# Patient Record
Sex: FEMALE | Race: WHITE
[De-identification: names, ages, dates, MRNs, and addresses within clinical notes are randomized per-mention and may not be internally consistent; named-entity substitution may affect disease eponyms.]

---

## 2017-06-02 ENCOUNTER — HOSPITAL ENCOUNTER (EMERGENCY)
Dept: HOSPITAL 47 - EC | Age: 57
Discharge: HOME | End: 2017-06-02
Payer: MEDICARE

## 2017-06-02 VITALS — DIASTOLIC BLOOD PRESSURE: 85 MMHG | HEART RATE: 95 BPM | RESPIRATION RATE: 20 BRPM | SYSTOLIC BLOOD PRESSURE: 192 MMHG

## 2017-06-02 VITALS — TEMPERATURE: 99.9 F

## 2017-06-02 DIAGNOSIS — Z88.6: ICD-10-CM

## 2017-06-02 DIAGNOSIS — Z79.899: ICD-10-CM

## 2017-06-02 DIAGNOSIS — H92.01: ICD-10-CM

## 2017-06-02 DIAGNOSIS — R04.0: Primary | ICD-10-CM

## 2017-06-02 DIAGNOSIS — I10: ICD-10-CM

## 2017-06-02 DIAGNOSIS — F17.200: ICD-10-CM

## 2017-06-02 PROCEDURE — 99283 EMERGENCY DEPT VISIT LOW MDM: CPT

## 2017-06-02 NOTE — ED
ENT HPI





- General


Stated complaint: nose bleed


Time Seen by Provider: 06/02/17 19:59


Source: EMS, RN notes reviewed


Mode of arrival: EMS


Limitations: no limitations





- History of Present Illness


Initial comments: 





56-year-old female presents to the emergency department with a chief complaint 

of epistaxis.  She states all centered now started to bleed today.  Patient 

states she has a history of nosebleeds.  Patient states she tried several home 

was unsuccessful.  Patient states it seems to have stopped now.  Patient states 

that she is getting some right ear pain as well.  Patient denies any fever 

chills cough cold runny nose.  Patient states that she just continued to have 

the bleeding so she thought that she should be seen and possibly get it taken 

care of.  Patient states that she is not currently having any other symptoms.

Patient denies any recent fever, chills, shortness of breath, chest pain, back 

pain, abdominal pain, nausea vomiting, numbness or tingling, dysuria or 

hematuria, constipation or diarrhea, headaches or visual changes, or any other 

current symptoms.





- Related Data


 Home Medications











 Medication  Instructions  Recorded  Confirmed


 


Ibuprofen [Motrin] 800 mg PO DAILY PRN 06/02/17 06/02/17


 


Lisinopril [Zestril] 10 mg PO DAILY 06/02/17 06/02/17


 


PARoxetine [Paxil] 20 mg PO DAILY 06/02/17 06/02/17








 Previous Rx's











 Medication  Instructions  Recorded


 


Amoxicillin/Potassium Clav 1 tab PO Q12HR #6 tab 06/02/17





[Augmentin 875-125 Tablet]  











 Allergies











Allergy/AdvReac Type Severity Reaction Status Date / Time


 


aspirin AdvReac  BRUISES Verified 06/02/17 20:16














Review of Systems


ROS Statement: 


Those systems with pertinent positive or pertinent negative responses have been 

documented in the HPI.





ROS Other: All systems not noted in ROS Statement are negative.





Past Medical History


Past Medical History: Hypertension


History of Any Multi-Drug Resistant Organisms: None Reported


Past Surgical History: Back Surgery, Hernia Repair, Hysterectomy


Past Psychological History: No Psychological Hx Reported


Smoking Status: Current every day smoker


Past Alcohol Use History: Daily


Past Drug Use History: None Reported





General Exam


Limitations: no limitations


General appearance: alert, in no apparent distress


Head exam: Present: atraumatic, normocephalic, normal inspection


Eye exam: Present: normal appearance, PERRL, EOMI.  Absent: scleral icterus, 

conjunctival injection, periorbital swelling


ENT exam: Present: normal oropharynx, other (Patient appears to bleeding from 

bilateral Kiesselbach's plexus).  Absent: TM's normal bilaterally (She does 

appear to have blood behind the right eardrum)


Neck exam: Present: normal inspection.  Absent: tenderness, meningismus, 

lymphadenopathy


Respiratory exam: Present: normal lung sounds bilaterally.  Absent: respiratory 

distress, wheezes, rales, rhonchi, stridor


Cardiovascular Exam: Present: regular rate, normal rhythm, normal heart sounds.

  Absent: systolic murmur, diastolic murmur, rubs, gallop, clicks


Neurological exam: Present: alert, oriented X3


Psychiatric exam: Present: normal affect, normal mood


Skin exam: Present: warm, dry, intact, normal color.  Absent: rash





Course


 Vital Signs











  06/02/17





  19:57


 


Temperature 99.9 F H


 


Pulse Rate 94


 


Respiratory 18





Rate 


 


Blood Pressure 156/79


 


O2 Sat by Pulse 95





Oximetry 














- Reevaluation(s)


Reevaluation #1: 





06/02/17 20:52


Patient's bleeding has stopped with the clamp.  This time we removed the clamp 

and feel the patient progresses.





Medical Decision Making





- Medical Decision Making





56-year-old female presents emergency Department chief complaint of epistaxis.  

Patient epistaxis.  Nasal clamping.  This time we discussed follow-up discussed 

return parameters discussed care.  We discussed all patient's questions.  She 

stated that she understood and she is the plan.  She will be discharged home.





Disposition


Clinical Impression: 


 Epistaxis





Disposition: HOME SELF-CARE


Condition: Stable


Instructions:  Nosebleed (ED)


Additional Instructions: 


Please use medication as discussed. Please follow up with family doctor if 

symptoms have not improved over the next two days. Please return to the 

emergency room if your symptoms increase or worsen or for any other concerns. 


Prescriptions: 


Amoxicillin/Potassium Clav [Augmentin 875-125 Tablet] 1 tab PO Q12HR #6 tab


Referrals: 


Leonila Pinon MD [Primary Care Provider] - 1-2 days


Jan Ventura MD [STAFF PHYSICIAN] - 1-2 days


Time of Disposition: 21:47

## 2018-10-21 ENCOUNTER — HOSPITAL ENCOUNTER (EMERGENCY)
Dept: HOSPITAL 47 - EC | Age: 58
LOS: 1 days | Discharge: HOME | End: 2018-10-22
Payer: MEDICARE

## 2018-10-21 VITALS — RESPIRATION RATE: 18 BRPM | TEMPERATURE: 98.7 F

## 2018-10-21 DIAGNOSIS — F17.200: ICD-10-CM

## 2018-10-21 DIAGNOSIS — I10: ICD-10-CM

## 2018-10-21 DIAGNOSIS — Z88.6: ICD-10-CM

## 2018-10-21 DIAGNOSIS — R55: Primary | ICD-10-CM

## 2018-10-21 DIAGNOSIS — Z79.899: ICD-10-CM

## 2018-10-21 DIAGNOSIS — F41.9: ICD-10-CM

## 2018-10-21 LAB
ALBUMIN SERPL-MCNC: 4.3 G/DL (ref 3.5–5)
ALP SERPL-CCNC: 77 U/L (ref 38–126)
ALT SERPL-CCNC: 54 U/L (ref 9–52)
ANION GAP SERPL CALC-SCNC: 14 MMOL/L
APTT BLD: 26.4 SEC (ref 22–30)
AST SERPL-CCNC: 48 U/L (ref 14–36)
BUN SERPL-SCNC: 8 MG/DL (ref 7–17)
CALCIUM SPEC-MCNC: 9 MG/DL (ref 8.4–10.2)
CELLS COUNTED: 100
CHLORIDE SERPL-SCNC: 99 MMOL/L (ref 98–107)
CK SERPL-CCNC: 328 U/L (ref 30–135)
CO2 SERPL-SCNC: 19 MMOL/L (ref 22–30)
D DIMER PPP FEU-MCNC: 0.67 MG/L FEU (ref ?–0.6)
EOSINOPHIL # BLD MANUAL: 0.07 K/UL (ref 0–0.7)
ERYTHROCYTE [DISTWIDTH] IN BLOOD BY AUTOMATED COUNT: 4.07 M/UL (ref 3.8–5.4)
ERYTHROCYTE [DISTWIDTH] IN BLOOD: 13.5 % (ref 11.5–15.5)
GLUCOSE SERPL-MCNC: 95 MG/DL (ref 74–99)
HCT VFR BLD AUTO: 38.9 % (ref 34–46)
HGB BLD-MCNC: 12.2 GM/DL (ref 11.4–16)
INR PPP: 1 (ref ?–1.2)
LYMPHOCYTES # BLD MANUAL: 1.94 K/UL (ref 1–4.8)
MCH RBC QN AUTO: 29.9 PG (ref 25–35)
MCHC RBC AUTO-ENTMCNC: 31.3 G/DL (ref 31–37)
MCV RBC AUTO: 95.7 FL (ref 80–100)
MONOCYTES # BLD MANUAL: 0.34 K/UL (ref 0–1)
NEUTROPHILS NFR BLD MANUAL: 64 %
NEUTS SEG # BLD MANUAL: 4.3 K/UL (ref 1.3–7.7)
PH UR: 6 [PH] (ref 5–8)
PLATELET # BLD AUTO: 358 K/UL (ref 150–450)
POTASSIUM SERPL-SCNC: 4.3 MMOL/L (ref 3.5–5.1)
PROT SERPL-MCNC: 7.7 G/DL (ref 6.3–8.2)
PT BLD: 9.6 SEC (ref 9–12)
SODIUM SERPL-SCNC: 132 MMOL/L (ref 137–145)
SP GR UR: 1 (ref 1–1.03)
TROPONIN I SERPL-MCNC: <0.012 NG/ML (ref 0–0.03)
UROBILINOGEN UR QL STRIP: <2 MG/DL (ref ?–2)
WBC # BLD AUTO: 6.7 K/UL (ref 3.8–10.6)

## 2018-10-21 PROCEDURE — 85379 FIBRIN DEGRADATION QUANT: CPT

## 2018-10-21 PROCEDURE — 71045 X-RAY EXAM CHEST 1 VIEW: CPT

## 2018-10-21 PROCEDURE — 85610 PROTHROMBIN TIME: CPT

## 2018-10-21 PROCEDURE — 80053 COMPREHEN METABOLIC PANEL: CPT

## 2018-10-21 PROCEDURE — 84484 ASSAY OF TROPONIN QUANT: CPT

## 2018-10-21 PROCEDURE — 93005 ELECTROCARDIOGRAM TRACING: CPT

## 2018-10-21 PROCEDURE — 99284 EMERGENCY DEPT VISIT MOD MDM: CPT

## 2018-10-21 PROCEDURE — 82550 ASSAY OF CK (CPK): CPT

## 2018-10-21 PROCEDURE — 82553 CREATINE MB FRACTION: CPT

## 2018-10-21 PROCEDURE — 80320 DRUG SCREEN QUANTALCOHOLS: CPT

## 2018-10-21 PROCEDURE — 36415 COLL VENOUS BLD VENIPUNCTURE: CPT

## 2018-10-21 PROCEDURE — 85730 THROMBOPLASTIN TIME PARTIAL: CPT

## 2018-10-21 PROCEDURE — 81003 URINALYSIS AUTO W/O SCOPE: CPT

## 2018-10-21 PROCEDURE — 85025 COMPLETE CBC W/AUTO DIFF WBC: CPT

## 2018-10-21 NOTE — XR
EXAMINATION TYPE: XR chest 1V portable

 

DATE OF EXAM: 10/21/2018

 

COMPARISON: NONE

 

HISTORY: Syncope

 

TECHNIQUE: Single frontal view of the chest is obtained.

 

FINDINGS:  Heart is normal. Lungs are clear of consolidation. Costophrenic angles are clear. There ar
e chest leads. Bony thorax is intact. There is no pleural effusion.

 

IMPRESSION:  No active cardiopulmonary disease.

## 2018-10-22 VITALS — HEART RATE: 87 BPM | DIASTOLIC BLOOD PRESSURE: 56 MMHG | SYSTOLIC BLOOD PRESSURE: 112 MMHG

## 2018-10-22 NOTE — ED
Syncope HPI





- General


Chief Complaint: Fall


Stated Complaint: Syncope


Time Seen by Provider: 10/21/18 22:30


Source: patient, EMS


Mode of arrival: EMS


Limitations: no limitations





- History of Present Illness


Initial Comments: 





This patient's 58-year-old woman who presents to be evaluated if she has 

syncopal episode tonight.  Patient had been in her kitchen at home.  She was 

seated on a stool.  Hours  noted that he heard a sound and she had 

fallen to the floor.  Patient reports that she feels she passed out.  She did 

not have any monitory symptoms, including no chest pain, palpitations, dyspnea, 

diaphoresis or other symptoms.  She denies having trauma.  She does admit that 

she had about 8 beers to drink today.  Patient had a similar episode this past 

week, acquiring a laceration that required suture repair here.


MD Complaint: loss of consciousness, collapsed


Onset/Timin


-: hour(s)


Prodromal Symptoms: none


Witnessed: yes - by bystander


Injuries Sustained Associated with Event: None


Current Symptoms: back to baseline


Context: alcohol use


Treatments Prior to Arrival: none





- Related Data


 Home Medications











 Medication  Instructions  Recorded  Confirmed


 


Ibuprofen [Motrin] 800 mg PO DAILY PRN 17


 


Lisinopril [Zestril] 10 mg PO DAILY 17


 


PARoxetine [Paxil] 20 mg PO DAILY 17








 Previous Rx's











 Medication  Instructions  Recorded


 


Amoxicillin/Potassium Clav 1 tab PO Q12HR #6 tab 17





[Augmentin 875-125 Tablet]  











 Allergies











Allergy/AdvReac Type Severity Reaction Status Date / Time


 


aspirin AdvReac  BRUISES Verified 17 20:16














Review of Systems


ROS Statement: 


Those systems with pertinent positive or pertinent negative responses have been 

documented in the HPI.





ROS Other: All systems not noted in ROS Statement are negative.


Constitutional: Denies: fever, chills, weakness


Eyes: Denies: eye pain, vision change


ENT: Denies: epistaxis


Respiratory: Denies: cough, dyspnea


Cardiovascular: Reports: syncope.  Denies: chest pain, palpitations, edema


Gastrointestinal: Denies: abdominal pain, nausea, vomiting


Genitourinary: Denies: dysuria


Musculoskeletal: Denies: back pain


Skin: Denies: rash


Neurological: Denies: headache, weakness, numbness





Past Medical History


Past Medical History: Hypertension, Syncope


History of Any Multi-Drug Resistant Organisms: None Reported


Past Surgical History: Back Surgery, Hernia Repair, Hysterectomy


Past Psychological History: Anxiety


Smoking Status: Current every day smoker


Past Alcohol Use History: Daily


Past Drug Use History: None Reported





General Exam


Limitations: no limitations


General appearance: alert, in no apparent distress


Head exam: Present: atraumatic, normocephalic


Eye exam: Present: normal appearance.  Absent: scleral icterus, conjunctival 

injection


ENT exam: Present: normal oropharynx


Neck exam: Present: normal inspection, full ROM.  Absent: tenderness


Respiratory exam: Present: normal lung sounds bilaterally.  Absent: respiratory 

distress, wheezes, rales, rhonchi, stridor, chest wall tenderness


Cardiovascular Exam: Present: regular rate, normal rhythm, normal heart sounds.

  Absent: systolic murmur, diastolic murmur, rubs, gallop


GI/Abdominal exam: Present: soft.  Absent: distended, tenderness, guarding, 

rebound, rigid, mass


Extremities exam: Present: normal inspection, normal capillary refill.  Absent: 

pedal edema, calf tenderness


Back exam: Present: normal inspection.  Absent: CVA tenderness (R), CVA 

tenderness (L), vertebral tenderness


Neurological exam: Present: alert, oriented X3, CN II-XII intact.  Absent: 

motor sensory deficit


Skin exam: Present: warm, dry, intact, normal color.  Absent: rash





Course


 Vital Signs











  10/21/18 10/21/18 10/21/18





  22:17 23:00 23:30


 


Temperature 98.7 F  


 


Pulse Rate 84 86 90


 


Respiratory 18 23 20





Rate   


 


Blood Pressure 131/77 131/77 131/77


 


O2 Sat by Pulse 93 L 94 L 95





Oximetry   














  10/22/18





  00:30


 


Temperature 


 


Pulse Rate 87


 


Respiratory 18





Rate 


 


Blood Pressure 112/56


 


O2 Sat by Pulse 95





Oximetry 














Medical Decision Making





- Lab Data


Result diagrams: 


 10/21/18 22:52





 10/21/18 22:52


 Lab Results











  10/21/18 10/21/18 10/21/18 Range/Units





  22:52 22:52 22:52 


 


WBC   6.7   (3.8-10.6)  k/uL


 


RBC   4.07   (3.80-5.40)  m/uL


 


Hgb   12.2   (11.4-16.0)  gm/dL


 


Hct   38.9   (34.0-46.0)  %


 


MCV   95.7   (80.0-100.0)  fL


 


MCH   29.9   (25.0-35.0)  pg


 


MCHC   31.3   (31.0-37.0)  g/dL


 


RDW   13.5   (11.5-15.5)  %


 


Plt Count   358   (150-450)  k/uL


 


Neutrophils % (Manual)   64   %


 


Band Neutrophils %   1   %


 


Lymphocytes % (Manual)   29   %


 


Monocytes % (Manual)   5   %


 


Eosinophils % (Manual)   1   %


 


Neutrophils # (Manual)   4.30   (1.3-7.7)  k/uL


 


Lymphocytes # (Manual)   1.94   (1.0-4.8)  k/uL


 


Monocytes # (Manual)   0.34   (0-1.0)  k/uL


 


Eosinophils # (Manual)   0.07   (0-0.7)  k/uL


 


Nucleated RBCs   0   (0-0)  /100 WBC


 


Manual Slide Review   Performed   


 


PT     (9.0-12.0)  sec


 


INR     (<1.2)  


 


APTT     (22.0-30.0)  sec


 


D-Dimer     (<0.60)  mg/L FEU


 


Sodium    132 L  (137-145)  mmol/L


 


Potassium    4.3  (3.5-5.1)  mmol/L


 


Chloride    99  ()  mmol/L


 


Carbon Dioxide    19 L  (22-30)  mmol/L


 


Anion Gap    14  mmol/L


 


BUN    8  (7-17)  mg/dL


 


Creatinine    0.65  (0.52-1.04)  mg/dL


 


Est GFR (CKD-EPI)AfAm    >90  (>60 ml/min/1.73 sqM)  


 


Est GFR (CKD-EPI)NonAf    >90  (>60 ml/min/1.73 sqM)  


 


Glucose    95  (74-99)  mg/dL


 


Calcium    9.0  (8.4-10.2)  mg/dL


 


Total Bilirubin    0.3  (0.2-1.3)  mg/dL


 


AST    48 H  (14-36)  U/L


 


ALT    54 H  (9-52)  U/L


 


Alkaline Phosphatase    77  ()  U/L


 


Total Creatine Kinase  328 H    ()  U/L


 


CK-MB (CK-2)  1.7    (0.0-2.4)  ng/mL


 


CK-MB (CK-2) Rel Index  0.5    


 


Troponin I  <0.012    (0.000-0.034)  ng/mL


 


Total Protein    7.7  (6.3-8.2)  g/dL


 


Albumin    4.3  (3.5-5.0)  g/dL


 


Urine Color     


 


Urine Appearance     (Clear)  


 


Urine pH     (5.0-8.0)  


 


Ur Specific Gravity     (1.001-1.035)  


 


Urine Protein     (Negative)  


 


Urine Glucose (UA)     (Negative)  


 


Urine Ketones     (Negative)  


 


Urine Blood     (Negative)  


 


Urine Nitrite     (Negative)  


 


Urine Bilirubin     (Negative)  


 


Urine Urobilinogen     (<2.0)  mg/dL


 


Ur Leukocyte Esterase     (Negative)  


 


Serum Alcohol     mg/dL














  10/21/18 10/21/18 10/21/18 Range/Units





  22:52 22:52 23:55 


 


WBC     (3.8-10.6)  k/uL


 


RBC     (3.80-5.40)  m/uL


 


Hgb     (11.4-16.0)  gm/dL


 


Hct     (34.0-46.0)  %


 


MCV     (80.0-100.0)  fL


 


MCH     (25.0-35.0)  pg


 


MCHC     (31.0-37.0)  g/dL


 


RDW     (11.5-15.5)  %


 


Plt Count     (150-450)  k/uL


 


Neutrophils % (Manual)     %


 


Band Neutrophils %     %


 


Lymphocytes % (Manual)     %


 


Monocytes % (Manual)     %


 


Eosinophils % (Manual)     %


 


Neutrophils # (Manual)     (1.3-7.7)  k/uL


 


Lymphocytes # (Manual)     (1.0-4.8)  k/uL


 


Monocytes # (Manual)     (0-1.0)  k/uL


 


Eosinophils # (Manual)     (0-0.7)  k/uL


 


Nucleated RBCs     (0-0)  /100 WBC


 


Manual Slide Review     


 


PT  9.6    (9.0-12.0)  sec


 


INR  1.0    (<1.2)  


 


APTT  26.4    (22.0-30.0)  sec


 


D-Dimer  0.67 H    (<0.60)  mg/L FEU


 


Sodium     (137-145)  mmol/L


 


Potassium     (3.5-5.1)  mmol/L


 


Chloride     ()  mmol/L


 


Carbon Dioxide     (22-30)  mmol/L


 


Anion Gap     mmol/L


 


BUN     (7-17)  mg/dL


 


Creatinine     (0.52-1.04)  mg/dL


 


Est GFR (CKD-EPI)AfAm     (>60 ml/min/1.73 sqM)  


 


Est GFR (CKD-EPI)NonAf     (>60 ml/min/1.73 sqM)  


 


Glucose     (74-99)  mg/dL


 


Calcium     (8.4-10.2)  mg/dL


 


Total Bilirubin     (0.2-1.3)  mg/dL


 


AST     (14-36)  U/L


 


ALT     (9-52)  U/L


 


Alkaline Phosphatase     ()  U/L


 


Total Creatine Kinase     ()  U/L


 


CK-MB (CK-2)     (0.0-2.4)  ng/mL


 


CK-MB (CK-2) Rel Index     


 


Troponin I     (0.000-0.034)  ng/mL


 


Total Protein     (6.3-8.2)  g/dL


 


Albumin     (3.5-5.0)  g/dL


 


Urine Color   Colorless   


 


Urine Appearance   Clear   (Clear)  


 


Urine pH   6.0   (5.0-8.0)  


 


Ur Specific Gravity   1.003   (1.001-1.035)  


 


Urine Protein   Negative   (Negative)  


 


Urine Glucose (UA)   Negative   (Negative)  


 


Urine Ketones   Negative   (Negative)  


 


Urine Blood   Negative   (Negative)  


 


Urine Nitrite   Negative   (Negative)  


 


Urine Bilirubin   Negative   (Negative)  


 


Urine Urobilinogen   <2.0   (<2.0)  mg/dL


 


Ur Leukocyte Esterase   Negative   (Negative)  


 


Serum Alcohol    123  mg/dL














Disposition


Clinical Impression: 


 Syncope





Disposition: HOME SELF-CARE


Condition: Good


Instructions:  Syncope (ED)


Is patient prescribed a controlled substance at d/c from ED?: No


Referrals: 


Leonila Pinon MD [Primary Care Provider] - 1-2 days

## 2018-10-27 ENCOUNTER — HOSPITAL ENCOUNTER (EMERGENCY)
Dept: HOSPITAL 47 - EC | Age: 58
Discharge: HOME | End: 2018-10-27
Payer: MEDICARE

## 2018-10-27 VITALS — HEART RATE: 98 BPM | RESPIRATION RATE: 16 BRPM | SYSTOLIC BLOOD PRESSURE: 116 MMHG | DIASTOLIC BLOOD PRESSURE: 91 MMHG

## 2018-10-27 DIAGNOSIS — F17.200: ICD-10-CM

## 2018-10-27 DIAGNOSIS — Z88.6: ICD-10-CM

## 2018-10-27 DIAGNOSIS — H73.893: Primary | ICD-10-CM

## 2018-10-27 DIAGNOSIS — Z79.899: ICD-10-CM

## 2018-10-27 DIAGNOSIS — I10: ICD-10-CM

## 2018-10-27 DIAGNOSIS — Z96.22: ICD-10-CM

## 2018-10-27 DIAGNOSIS — R04.0: ICD-10-CM

## 2018-10-27 DIAGNOSIS — F41.9: ICD-10-CM

## 2018-10-27 PROCEDURE — 99283 EMERGENCY DEPT VISIT LOW MDM: CPT

## 2018-10-27 PROCEDURE — 70486 CT MAXILLOFACIAL W/O DYE: CPT

## 2018-10-27 NOTE — ED
ENT HPI





- General


Chief complaint: ENT


Stated complaint: Nose Bleed, Bleeding from ears


Time Seen by Provider: 10/27/18 00:45


Source: patient


Mode of arrival: wheelchair


Limitations: no limitations





- History of Present Illness


Initial comments: 


58-year-old female patient presents to the emergency department today for 

evaluation of nosebleed and bleeding from the ears.  Patient states this 

started approximately one hour ago.  States she is having difficulty getting 

the bleeding to stop at home.  Patient states that she has had 1 nosebleed in 

the past are seen and treated for nasal polyp by the ears, nose, throat 

specialist.  Patient states that she did have a fall approximately 6 days ago 

where she did hit her nose on the ground.  Patient states that she was seen and 

evaluated after that event and was discharged home.  Patient states that he did 

not perform any imaging of her facial bones to evaluate nasal fracture.  

Patient states that she does have a history of ear problems and does have tubes 

to the bilateral ears.  Patient denies any current pain, headache, blurred 

vision, or double vision.  Denies any fall today.  She denies any dizziness or 

weakness.  Denies any use of anticoagulants or antiplatelet medications. 

Patient denies any recent rash, fever, chills, shortness breath, chest pain, 

abdominal pain, nausea, vomiting, diarrhea, constipation, back pain, numbness, 

tingling, hematuria, dysuria, urinary urgency, urinary frequency, or any other 

complaints.








- Related Data


 Home Medications











 Medication  Instructions  Recorded  Confirmed


 


Ibuprofen [Motrin] 800 mg PO DAILY PRN 06/02/17 06/02/17


 


Lisinopril [Zestril] 10 mg PO DAILY 06/02/17 06/02/17


 


PARoxetine [Paxil] 20 mg PO DAILY 06/02/17 06/02/17








 Previous Rx's











 Medication  Instructions  Recorded


 


Amoxicillin/Potassium Clav 1 tab PO Q12HR #6 tab 06/02/17





[Augmentin 875-125 Tablet]  











 Allergies











Allergy/AdvReac Type Severity Reaction Status Date / Time


 


aspirin AdvReac  BRUISES Verified 10/27/18 00:29














Review of Systems


ROS Statement: 


Those systems with pertinent positive or pertinent negative responses have been 

documented in the HPI.





ROS Other: All systems not noted in ROS Statement are negative.





Past Medical History


Past Medical History: Hypertension, Syncope


History of Any Multi-Drug Resistant Organisms: None Reported


Past Surgical History: Back Surgery, Hernia Repair, Hysterectomy


Past Psychological History: Anxiety


Smoking Status: Current every day smoker


Past Alcohol Use History: Daily


Past Drug Use History: None Reported





General Exam


Limitations: no limitations


General appearance: alert, in no apparent distress, other (This is a well-

developed, well-nourished adult female patient in no acute distress.  Vital 

signs upon presentation are pulse 103, respirations 18, blood pressure 103/84, 

pulse ox 97% on room air.)


Eye exam: Present: normal appearance, PERRL, EOMI.  Absent: scleral icterus, 

conjunctival injection, periorbital swelling


ENT exam: Present: normal oropharynx, mucous membranes moist, other (Patient 

does exhibit bilateral nare epistaxis, nasal septum intact with no evidence of 

septal hematoma).  Absent: normal exam, TM's normal bilaterally (Bilateral 

hemotympanum, bilateral tympanostomy tubes present, dried blood in the ear canal

, no active bleeding)


Respiratory exam: Present: normal lung sounds bilaterally.  Absent: respiratory 

distress, wheezes, rales, rhonchi, stridor


Cardiovascular Exam: Present: regular rate, normal rhythm, normal heart sounds.

  Absent: systolic murmur, diastolic murmur, rubs, gallop, clicks


Neurological exam: Present: alert, oriented X3, CN II-XII intact


Psychiatric exam: Present: normal affect, normal mood


Skin exam: Present: warm, dry, intact, normal color.  Absent: rash





Course


 Vital Signs











  10/27/18 10/27/18





  00:28 02:39


 


Pulse Rate 103 H 98


 


Respiratory 18 16





Rate  


 


Blood Pressure 103/84 116/91


 


O2 Sat by Pulse 97 98





Oximetry  














Medical Decision Making





- Medical Decision Making


58-year-old female patient presents to the emergency department today for 

complaints of epistaxis and bleeding from the ears.  Physical examination did 

reveal bleeding from the bilateral nares.  Visualization with otoscope did 

reveal bilateral hemotympanums with tympanostomy tubes present.  There is dried 

blood in each ear canal.  Patient did expensive a fall with some facial trauma 

6 days ago.  Did perform CT facial bones which did show evidence of blood clot 

in the nasopharynx, fluid in the sinuses, and presence of fluid in the right 

middle ear consistent with hemorrhage or inflammation. No evidence of acute 

fractures.  Was able to get the epistaxis controlled using pressure and Afrin.  

Did discuss findings and results with the patient.  She'll be discharged home 

to follow-up with ENT specialty for further evaluation.  She was given 

instructions to manage epistaxis at home and return parameters discussed in 

detail.  She verbalizes understanding and agrees with this plan.








- Radiology Data


Radiology results: report reviewed, image reviewed


CT facial bones without contrast was obtained.  Report was reviewed in its 

entirety.  Impression by Dr. Daniel shows mucosal thickening in the ethmoid 

sphenoid and maxillary sinuses consistent with sinusitis.  Old blowout fracture 

of the left bony orbit.  No acute fracture seen.  There is probably old nasal 

bone fracture.  Extensive density in the nasopharynx consistent with debris and 

blood clot.  Debris in the right middle ear cavity consistent with hemorrhage 

or inflammatory process.





Disposition


Clinical Impression: 


 Epistaxis, Hemotympanum





Disposition: HOME SELF-CARE


Condition: Good


Instructions:  Nosebleed (ED)


Additional Instructions: 


Follow-up with ear, nose, and throat specialist as soon as possible.  If 

bleeding starts again blow nose, spray Afrin in each nostril, and hold pressure 

for 20 minutes.  If bleeding persists beyond this return to the emergency 

department immediately.  Return immediately for any other new, worsening, or 

concerning symptoms.


Is patient prescribed a controlled substance at d/c from ED?: No


Referrals: 


Leonila Pinon MD [Primary Care Provider] - 1-2 days


Praful Loza MD [STAFF PHYSICIAN] - 1-2 days


Time of Disposition: 02:24

## 2018-10-27 NOTE — CT
EXAMINATION TYPE: CT facial bones wo con

 

DATE OF EXAM: 10/27/2018

 

COMPARISON: None

 

HISTORY: nose bleed and bleeding from both ears

 

CT DLP: 305.7 mGycm

Automated exposure control for dose reduction was used.

 

TECHNIQUE: CT scan of the sinuses is performed without contrast, axial images are obtained, coronal r
eformatted images are also reviewed.

 

FINDINGS: There is extensive thickening in the nasopharynx. This is consistent with the brain and blo
od clot. There is fluid level in the right maxillary sinus. There is mild mucosal thickening left max
illary sinus. There is small area of fluid level right side of the sphenoid sinus. The frontal sinuse
s are fairly well aerated. There is mucosal thickening in the anterior ethmoid air cells. Nasal bone 
deviates to the right side without evidence of a fracture. This is probably due to old injury. There 
is depression of the floor of the left bony orbit consistent with an old blowout fracture. The maxill
a appears intact.

Zygomatic arches appear normal. There is no evidence of orbital mass.

 

External auditory canals appear normal. There is increased density in the right middle ear cavity. Th
ere is normal aeration of the epitympanic recess bilaterally. Mastoid air cells are normally aerated.
 Nasal septum deviates to the left side. The zygomatic arches appear normal.

 

IMPRESSION: There is mucosal thickening in the ethmoid sphenoid maxillary sinuses consistent with sin
usitis. Old blowout fracture left bony orbit. No acute fracture seen. There is probably old nasal bon
e fracture. Extensive density in the nasopharynx consistent with debris and blood clot.

 

Debris in the right right middle ear cavity consistent with hemorrhage or inflammatory process.

## 2018-10-31 ENCOUNTER — HOSPITAL ENCOUNTER (OUTPATIENT)
Dept: HOSPITAL 47 - LABWHC1 | Age: 58
End: 2018-10-31
Attending: OTOLARYNGOLOGY
Payer: MEDICARE

## 2018-10-31 DIAGNOSIS — R58: Primary | ICD-10-CM

## 2018-10-31 LAB
CELLS COUNTED: 100
EOSINOPHIL # BLD MANUAL: 0.12 K/UL (ref 0–0.7)
ERYTHROCYTE [DISTWIDTH] IN BLOOD BY AUTOMATED COUNT: 3.83 M/UL (ref 3.8–5.4)
ERYTHROCYTE [DISTWIDTH] IN BLOOD: 13.7 % (ref 11.5–15.5)
HCT VFR BLD AUTO: 38.1 % (ref 34–46)
HGB BLD-MCNC: 11.7 GM/DL (ref 11.4–16)
INR PPP: 1 (ref ?–1.2)
LYMPHOCYTES # BLD MANUAL: 1.43 K/UL (ref 1–4.8)
MCH RBC QN AUTO: 30.4 PG (ref 25–35)
MCHC RBC AUTO-ENTMCNC: 30.6 G/DL (ref 31–37)
MCV RBC AUTO: 99.4 FL (ref 80–100)
MONOCYTES # BLD MANUAL: 0.5 K/UL (ref 0–1)
NEUTROPHILS NFR BLD MANUAL: 67 %
NEUTS SEG # BLD MANUAL: 4.15 K/UL (ref 1.3–7.7)
PLATELET # BLD AUTO: 425 K/UL (ref 150–450)
PT BLD: 9.6 SEC (ref 9–12)
WBC # BLD AUTO: 6.2 K/UL (ref 3.8–10.6)

## 2018-10-31 PROCEDURE — 36415 COLL VENOUS BLD VENIPUNCTURE: CPT

## 2018-10-31 PROCEDURE — 85610 PROTHROMBIN TIME: CPT

## 2018-10-31 PROCEDURE — 85025 COMPLETE CBC W/AUTO DIFF WBC: CPT

## 2018-12-06 ENCOUNTER — HOSPITAL ENCOUNTER (OUTPATIENT)
Dept: HOSPITAL 47 - OR | Age: 58
Discharge: HOME | End: 2018-12-06
Attending: OTOLARYNGOLOGY
Payer: MEDICARE

## 2018-12-06 VITALS — RESPIRATION RATE: 18 BRPM

## 2018-12-06 VITALS — HEART RATE: 72 BPM | SYSTOLIC BLOOD PRESSURE: 129 MMHG | DIASTOLIC BLOOD PRESSURE: 80 MMHG

## 2018-12-06 VITALS — TEMPERATURE: 98 F

## 2018-12-06 VITALS — BODY MASS INDEX: 30.9 KG/M2

## 2018-12-06 DIAGNOSIS — I10: ICD-10-CM

## 2018-12-06 DIAGNOSIS — S02.2XXA: Primary | ICD-10-CM

## 2018-12-06 DIAGNOSIS — J34.2: ICD-10-CM
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
## 2018-12-06 DIAGNOSIS — Z72.0: ICD-10-CM

## 2018-12-06 DIAGNOSIS — J34.3: ICD-10-CM

## 2018-12-06 DIAGNOSIS — Z79.899: ICD-10-CM

## 2018-12-06 DIAGNOSIS — Z79.1: ICD-10-CM

## 2018-12-06 DIAGNOSIS — R55: ICD-10-CM

## 2018-12-06 PROCEDURE — 30140 RESECT INFERIOR TURBINATE: CPT

## 2018-12-06 PROCEDURE — 88300 SURGICAL PATH GROSS: CPT

## 2018-12-06 PROCEDURE — 30520 REPAIR OF NASAL SEPTUM: CPT

## 2018-12-06 RX ADMIN — HYDROMORPHONE HYDROCHLORIDE PRN MG: 1 INJECTION, SOLUTION INTRAMUSCULAR; INTRAVENOUS; SUBCUTANEOUS at 12:10

## 2018-12-06 RX ADMIN — HYDROMORPHONE HYDROCHLORIDE PRN MG: 1 INJECTION, SOLUTION INTRAMUSCULAR; INTRAVENOUS; SUBCUTANEOUS at 12:00

## 2018-12-06 NOTE — P.OP
Date of Procedure: 12/06/18


Preoperative Diagnosis: 


Deviated nasal septum, severe


Bilateral hypertrophy of inferior nasal turbinates with obstruction


Postoperative Diagnosis: 


Same


Procedure(s) Performed: 


Septoplasty


Bilateral submucosal resection of the inferior turbinates with outfracture and 

compression


Anesthesia: JENNI


Surgeon: Brian Werner


Estimated Blood Loss (ml): 10


Pathology: other (Sinonasal)


Condition: stable


Disposition: PACU


Indications for Procedure: 


This patient presented to the office with a history of a nasal trauma.  She 

also had recurring nosebleeds and has nasal obstruction.  She had a fall on 

1019 of 2018.  She was found have a fractured septum with deviation and large 

obstructive inferior turbinates and total nasal obstruction because of the 

trauma.  Surgical correction was recommended.  All risks, benefits, and 

alternative therapies were discussed.  Consent was obtained and all questions 

were answered.


Operative Findings: 


Patient is a severe left septal deviation with large obstructive inferior 

turbinates.


Description of Procedure: 


 This patient was taken to the operative room and placed in the supine 

position.  A general inhalation anesthetic was administered to the patient by 

the department of anesthesia with a functioning IV line in place.  The patient 

was monitored throughout the entire case by the department of anesthesia.  The 

eyes were taped shut for protection.  The patient was placed in a slight 

reverse Trendelenburg position.  The patient had previously utilize Afrin nasal 

spray preoperatively.  The nose was evaluated and the septum lateral nasal wall 

and inferior turbinates were injected with lidocaine 1% with epinephrine 1 100,

000 bilaterally.  Approximately 10 minutes were allowed wait for full 

vasoconstrictive effects to take place.





At this point a caudal incision was made over the caudal portion of the left 

septum down to the mucoperichondrium.  A mucoperichondrial flap was elevated on 

the left side and dissection was carried with use of tunnels posteriorly.  We 

then made a crossover incision through the cartilage to the contralateral side 

and for the mucoperichondrial flap development was performed to the extent of 

visualization on the contralateral side.  After the cartilage was freed with 

use of several crosshatching incisions and removal of some redundant strips of 

septal cartilage, the septum was straightened and placed back in the midline.  

The septum was sutured fixated to the ovarian groove.  Excellent straightening 

occurred and the septum was visibly straight.  Incision was closed with a 40 

rapid Vicryl.  We utilized a running nonlocking fashion for closure of the 

incision.  A quilting stitch was used to reapproximate the septal flaps with 

use of a 40 rapid Vicryl.





Intranasal splints were inserted and fixated at the end of the case.  We 

utilized [place] nasal splints.  There will be removed and the patient returns to 

the office.





Attention was then paid to the inferior turbinates.  The bilateral inferior 

turbinates were hypertrophic and obstructive.  We entered the anterior portion 

of the inferior turbinates with use of a microdebrider.  We remove bone and 

submucosal elements with use of a microdebrider bilaterally.  The inferior 

turbinates underwent a submucosal resection with removal of submucosal tissue 

and bone.  We obtained a much better and normal in size for breathing.  The 

inferior turbinates were then outfractured and compressed with a Boyes nasal 

elevator.  Excellent airway was obtained and was symmetric bilaterally.  No 

bleeding was encountered.

## 2021-04-30 ENCOUNTER — HOSPITAL ENCOUNTER (EMERGENCY)
Dept: HOSPITAL 47 - EC | Age: 61
Discharge: HOME | End: 2021-04-30
Payer: MEDICARE

## 2021-04-30 VITALS — HEART RATE: 78 BPM | TEMPERATURE: 98.8 F | SYSTOLIC BLOOD PRESSURE: 128 MMHG | DIASTOLIC BLOOD PRESSURE: 78 MMHG

## 2021-04-30 VITALS — RESPIRATION RATE: 16 BRPM

## 2021-04-30 DIAGNOSIS — M62.830: Primary | ICD-10-CM

## 2021-04-30 DIAGNOSIS — I10: ICD-10-CM

## 2021-04-30 DIAGNOSIS — M79.604: ICD-10-CM

## 2021-04-30 DIAGNOSIS — F17.210: ICD-10-CM

## 2021-04-30 DIAGNOSIS — J44.9: ICD-10-CM

## 2021-04-30 PROCEDURE — 96374 THER/PROPH/DIAG INJ IV PUSH: CPT

## 2021-04-30 PROCEDURE — 93971 EXTREMITY STUDY: CPT

## 2021-04-30 PROCEDURE — 96372 THER/PROPH/DIAG INJ SC/IM: CPT

## 2021-04-30 PROCEDURE — 99284 EMERGENCY DEPT VISIT MOD MDM: CPT

## 2021-04-30 NOTE — ED
Back Pain HPI





- General


Chief Complaint: Back Pain/Injury


Stated Complaint: BACK PAIN


Time Seen by Provider: 04/30/21 14:23


Source: patient


Limitations: no limitations





- History of Present Illness


Initial Comments: 





60-year-old white female, alert and oriented 4, presents to the emergency room 

with complaints of 1 month of low back pain.  She denies any injury.  States 

pain is worse when she coughs which exacerbates the spasms.  Patient states seen

her primary care doctor on Wednesday and was prescribed Protonix and prednisone 

with no relief.  Patient states has a history of hypertension and chronic back 

pain with L4-L5 fusion 10 years prior.  Patient admits to smoking a pack a day 

for over 40 years, drinks 8 beers a day for "a long time".  Patient states her 

doctor did tell her she has COPD in the past also.  Patient denies any seizures 

from drinking. Family member at bedside.  Patient also has a surgical history 

colonoscopy hysterectomy and hernia repair.


MD Complaint: back pain


-: month(s) (1)


Place: home


Radiation: right leg


Severity scale (1-10): 10


Quality: aching


Consistency: intermittent


Improves With: immobilization


Worsens With: movement, deep breaths/cough


Context: other (L4L5 fusion 10 years ago)


Associated Symptoms: other (pain behind right knee, swelling to right foot, r

ight calf pain)


Treatments Prior to Arrival: other (prednisone since Wednesday )





- Related Data


                                Home Medications











 Medication  Instructions  Recorded  Confirmed


 


Ibuprofen [Motrin] 800 mg PO DAILY PRN 06/02/17 12/06/18


 


PARoxetine [Paxil] 20 mg PO DAILY 06/02/17 12/06/18


 


lisinopriL [Zestril] 20 mg PO DAILY 06/02/17 12/06/18








                                  Previous Rx's











 Medication  Instructions  Recorded


 


Amoxicillin/Potassium Clav 1 each PO Q12HR #20 tab 12/06/18





[Augmentin 875-125 Tablet]  


 


Meloxicam [Mobic] 15 mg PO DAILY #10 tab 12/06/18


 


Cyclobenzaprine [Flexeril] 5 mg PO TID PRN #15 tablet 04/30/21











                                    Allergies











Allergy/AdvReac Type Severity Reaction Status Date / Time


 


aspirin AdvReac  BRUISES Verified 04/30/21 14:08














Review of Systems


ROS Statement: 


Those systems with pertinent positive or pertinent negative responses have been 

documented in the HPI.





ROS Other: All systems not noted in ROS Statement are negative.





Past Medical History


Past Medical History: Hypertension, Syncope


Additional Past Medical History / Comment(s): chronic back pain


History of Any Multi-Drug Resistant Organisms: None Reported


Past Surgical History: Back Surgery, Hernia Repair, Hysterectomy


Additional Past Surgical History / Comment(s): BMT.  COLONOSCOPY


Past Anesthesia/Blood Transfusion Reactions: No Reported Reaction


Past Psychological History: Anxiety


Smoking Status: Current every day smoker


Past Alcohol Use History: Daily


Past Drug Use History: None Reported





- Past Family History


  ** Mother


Family Medical History: No Reported History





General Exam


Limitations: no limitations


General appearance: alert, in no apparent distress


Head exam: Present: atraumatic, normocephalic, normal inspection


Eye exam: Present: normal appearance, PERRL, EOMI.  Absent: scleral icterus, 

conjunctival injection, periorbital swelling


ENT exam: Present: normal exam, normal oropharynx, mucous membranes moist


Neck exam: Present: normal inspection, full ROM.  Absent: tenderness, 

meningismus, lymphadenopathy, thyromegaly


Respiratory exam: Present: wheezes.  Absent: respiratory distress, chest wall 

tenderness, accessory muscle use, decreased breath sounds


Cardiovascular Exam: Present: tachycardia.  Absent: JVD


GI/Abdominal exam: Present: soft, normal bowel sounds.  Absent: distended, 

tenderness, guarding, rebound, rigid


Extremities exam: Present: tenderness, normal capillary refill (right foot 

swelling, right calf pain), pedal edema, calf tenderness


Back exam: Present: tenderness, muscle spasm


Neurological exam: Present: alert, oriented X3, CN II-XII intact


Psychiatric exam: Present: normal affect, normal mood


Skin exam: Present: warm, dry, intact, normal color.  Absent: rash





Course


                                   Vital Signs











  04/30/21





  14:05


 


Temperature 98 F


 


Pulse Rate 105 H


 


Respiratory 16





Rate 


 


Blood Pressure 102/73


 


O2 Sat by Pulse 98





Oximetry 














Medical Decision Making





- Medical Decision Making





Patient with right leg swelling negative for DVT.  Patient did get pain relief 

from the muscle spasms with the Toradol and Norflex.  There is no neurovascular 

compromise, positive pedal pulses and capillary refill less than 2 seconds.  

There is no erythema or signs and symptoms of infection.  Patient agreeable to 

discharge and has an appointment with her primary care doctor on Wednesday for 

follow-up.  Patient will be prescribed Flexeril and to continue home 

medications.  Case discussed with Dr. Salinas who was agreeable to this plan.





Disposition


Clinical Impression: 


 Muscle spasm of back





Disposition: HOME SELF-CARE


Condition: Good


Instructions (If sedation given, give patient instructions):  Acute Low Back 

Pain (ED), Muscle Spasm (ED)


Additional Instructions: 


Continue the medications as prescribed by primary care doctor, do not take 

Motrin until prednisone dose is complete.  Flexeril as needed for muscle spasms,

 do not drink alcohol while taking Flexeril.  Keep your appointment with the 

primary doctor on Wednesday for follow-up.


Prescriptions: 


Cyclobenzaprine [Flexeril] 5 mg PO TID PRN #15 tablet


 PRN Reason: Muscle Spasm


Is patient prescribed a controlled substance at d/c from ED?: No


Referrals: 


Leonila Pinon MD [Primary Care Provider] - 1-2 days


Time of Disposition: 16:27

## 2021-04-30 NOTE — US
EXAMINATION TYPE: US venous doppler duplex LE RT

 

DATE OF EXAM: 4/30/2021 3:13 PM

 

COMPARISON: NONE

 

CLINICAL HISTORY: calf pain, + homans.

 

SIDE PERFORMED: Right  

 

TECHNIQUE:  The lower extremity deep venous system is examined utilizing real time linear array sonog
woodrow with graded compression, doppler sonography and color-flow sonography.

 

VESSELS IMAGED:

Common Femoral Vein

Deep Femoral Vein

Greater Saphenous Vein *

Femoral Vein

Popliteal Vein

Small Saphenous Vein *

Proximal Calf Veins

(* superficial vessels)

 

 

 

Right Leg:  Negative for DVT

 

 

 

 

 

IMPRESSION: No evidence for DVT at this time.

## 2021-05-05 ENCOUNTER — HOSPITAL ENCOUNTER (INPATIENT)
Dept: HOSPITAL 47 - EC | Age: 61
LOS: 2 days | Discharge: NURSING, REHAB OR LONG-TERM CARE | DRG: 543 | End: 2021-05-07
Attending: HOSPITALIST | Admitting: HOSPITALIST
Payer: MEDICARE

## 2021-05-05 DIAGNOSIS — Z90.710: ICD-10-CM

## 2021-05-05 DIAGNOSIS — Z71.6: ICD-10-CM

## 2021-05-05 DIAGNOSIS — M43.17: ICD-10-CM

## 2021-05-05 DIAGNOSIS — M51.16: ICD-10-CM

## 2021-05-05 DIAGNOSIS — M48.061: ICD-10-CM

## 2021-05-05 DIAGNOSIS — R32: ICD-10-CM

## 2021-05-05 DIAGNOSIS — M48.07: ICD-10-CM

## 2021-05-05 DIAGNOSIS — Z87.42: ICD-10-CM

## 2021-05-05 DIAGNOSIS — J44.9: ICD-10-CM

## 2021-05-05 DIAGNOSIS — I95.9: ICD-10-CM

## 2021-05-05 DIAGNOSIS — F41.9: ICD-10-CM

## 2021-05-05 DIAGNOSIS — E22.2: ICD-10-CM

## 2021-05-05 DIAGNOSIS — Z79.899: ICD-10-CM

## 2021-05-05 DIAGNOSIS — Z98.890: ICD-10-CM

## 2021-05-05 DIAGNOSIS — Z98.1: ICD-10-CM

## 2021-05-05 DIAGNOSIS — F17.210: ICD-10-CM

## 2021-05-05 DIAGNOSIS — Z20.822: ICD-10-CM

## 2021-05-05 DIAGNOSIS — I10: ICD-10-CM

## 2021-05-05 DIAGNOSIS — Z87.19: ICD-10-CM

## 2021-05-05 DIAGNOSIS — M84.48XA: Primary | ICD-10-CM

## 2021-05-05 DIAGNOSIS — Z88.6: ICD-10-CM

## 2021-05-05 DIAGNOSIS — G89.29: ICD-10-CM

## 2021-05-05 LAB
ANION GAP SERPL CALC-SCNC: 10 MMOL/L
BASOPHILS # BLD AUTO: 0.1 K/UL (ref 0–0.2)
BASOPHILS NFR BLD AUTO: 1 %
BUN SERPL-SCNC: 12 MG/DL (ref 7–17)
CALCIUM SPEC-MCNC: 9.7 MG/DL (ref 8.4–10.2)
CHLORIDE SERPL-SCNC: 97 MMOL/L (ref 98–107)
CO2 SERPL-SCNC: 24 MMOL/L (ref 22–30)
EOSINOPHIL # BLD AUTO: 0 K/UL (ref 0–0.7)
EOSINOPHIL NFR BLD AUTO: 1 %
ERYTHROCYTE [DISTWIDTH] IN BLOOD BY AUTOMATED COUNT: 3.7 M/UL (ref 3.8–5.4)
ERYTHROCYTE [DISTWIDTH] IN BLOOD: 12.2 % (ref 11.5–15.5)
GLUCOSE SERPL-MCNC: 90 MG/DL (ref 74–99)
HCT VFR BLD AUTO: 38.4 % (ref 34–46)
HGB BLD-MCNC: 12.9 GM/DL (ref 11.4–16)
LYMPHOCYTES # SPEC AUTO: 1.6 K/UL (ref 1–4.8)
LYMPHOCYTES NFR SPEC AUTO: 19 %
MCH RBC QN AUTO: 34.8 PG (ref 25–35)
MCHC RBC AUTO-ENTMCNC: 33.5 G/DL (ref 31–37)
MCV RBC AUTO: 103.9 FL (ref 80–100)
MONOCYTES # BLD AUTO: 0.5 K/UL (ref 0–1)
MONOCYTES NFR BLD AUTO: 6 %
NEUTROPHILS # BLD AUTO: 6.1 K/UL (ref 1.3–7.7)
NEUTROPHILS NFR BLD AUTO: 72 %
PH UR: 6 [PH] (ref 5–8)
PLATELET # BLD AUTO: 413 K/UL (ref 150–450)
POTASSIUM SERPL-SCNC: 4.3 MMOL/L (ref 3.5–5.1)
SODIUM SERPL-SCNC: 131 MMOL/L (ref 137–145)
SP GR UR: 1.01 (ref 1–1.03)
UROBILINOGEN UR QL STRIP: <2 MG/DL (ref ?–2)
WBC # BLD AUTO: 8.5 K/UL (ref 3.8–10.6)

## 2021-05-05 PROCEDURE — 72131 CT LUMBAR SPINE W/O DYE: CPT

## 2021-05-05 PROCEDURE — 72158 MRI LUMBAR SPINE W/O & W/DYE: CPT

## 2021-05-05 PROCEDURE — 99285 EMERGENCY DEPT VISIT HI MDM: CPT

## 2021-05-05 PROCEDURE — 81003 URINALYSIS AUTO W/O SCOPE: CPT

## 2021-05-05 PROCEDURE — 85025 COMPLETE CBC W/AUTO DIFF WBC: CPT

## 2021-05-05 PROCEDURE — 87636 SARSCOV2 & INF A&B AMP PRB: CPT

## 2021-05-05 PROCEDURE — 80048 BASIC METABOLIC PNL TOTAL CA: CPT

## 2021-05-05 PROCEDURE — 85730 THROMBOPLASTIN TIME PARTIAL: CPT

## 2021-05-05 PROCEDURE — 36415 COLL VENOUS BLD VENIPUNCTURE: CPT

## 2021-05-05 PROCEDURE — 96374 THER/PROPH/DIAG INJ IV PUSH: CPT

## 2021-05-05 RX ADMIN — HYDROMORPHONE HYDROCHLORIDE PRN MG: 1 INJECTION, SOLUTION INTRAMUSCULAR; INTRAVENOUS; SUBCUTANEOUS at 19:57

## 2021-05-05 RX ADMIN — HYDROMORPHONE HYDROCHLORIDE PRN MG: 1 INJECTION, SOLUTION INTRAMUSCULAR; INTRAVENOUS; SUBCUTANEOUS at 23:05

## 2021-05-05 NOTE — CT
EXAM:

  CT Lumbar Spine Without Intravenous Contrast

 

CLINICAL HISTORY:

  ITS.REASON CT Reason: back pain

 

TECHNIQUE:

  Axial computed tomography images of the lumbar spine without 

intravenous contrast.  This CT exam was performed using one or more of 

the following dose reduction techniques: automated exposure control, 

adjustment of the mA and/or kV according to patient size, and/or use of 

iterative reconstruction technique.

 

COMPARISON:

  None

 

FINDINGS:

  Bones: Posterior fusion changes at L4-5.  Partially lumbarized S1 

vertebral body.  Nondisplaced fractures of bilateral sacral ala which 

appear acute or subacute.

 

  Disc spaces: Degenerative changes of the spine, most prominently at L3-

4.  Grade 1 anterolisthesis of L5 on S1.  Evaluation of the lower spinal 

canal is limited by artifact from the fusion hardware.  Probable moderate 

to severe neural foraminal stenoses at L3-4.  Moderate to severe right 

and mild left neural foraminal stenoses at L4-5, and severe neural 

foraminal stenoses at L5-S1.

 

  Soft tissues: Normal.

 

  Other: Nonspecific thickening of the adrenal glands.  Nonspecific mild 

bilateral perinephric fat stranding.  Diverticulosis.  Atherosclerotic 

changes of the vasculature.  Trace presacral fluid.

 

IMPRESSION:   

  Nondisplaced fractures of bilateral sacral ala which appear acute or 

subacute.

## 2021-05-05 NOTE — ED
General Adult HPI





- General


Chief complaint: Back Pain/Injury


Stated complaint: Back Pain


Time Seen by Provider: 05/05/21 15:33


Source: patient, family


Mode of arrival: ambulatory


Limitations: physical limitation





- History of Present Illness


Initial comments: 


Dictation was produced using dragon dictation software. please excuse any 

grammatical, word or spelling errors. 





This patient was cared for during a federal and state declared state of [facility]

gency secondary to Covid 19





Chief Complaint: 60-year-old female presents with back pain





History of Present Illness: Is 60-year-old female presents to the emergency 

department for back pain.  Patient states that she was here the emergency 

department one week ago for the same complaint was given prescription for 

Flexeril.  She does have outpatient follow-up with pain specialist Dr. Abbott. 

States that she is on twice.  Initially she was on a course of prednisone with 

no effect.  She came to the emergency department after that and was given a 

prescription for Flexeril.  States Flexeril does not help her find any relief.  

She is a history of back pain.  10-12 years ago she had a fusion surgery done at

[place] by Dr. Prasad per she has not seen Dr. Prasad does surround the time 

of the surgery.  She states that after the surgery several years ago she noticed

significant improvement of her symptoms.  She states she had a fusion in her 

lumbar spine.  She states she is having trouble getting to the bathroom because 

of the pain.  She states that the pain is preventing her from getting to the 

bathroom in time causing her to urinate on herself.  Denies any saddle 

anesthesia.  No fevers.  She does not remember the last time she had a CT or an 

MRI of her lumbar spine.





The ROS documented in this emergency department record has been reviewed and 

confirmed by me.  Those systems with pertinent positive or negative responses 

have been documented in the HPI.  All other systems are other negative and/or 

noncontributory.








PHYSICAL EXAM:


General Impression: Alert and oriented x3, not in acute distress


HEENT: Normocephalic atraumatic, extra-ocular movements intact, pupils equal and

reactive to light bilaterally, mucous membranes moist.


Cardiovascular: Heart regular rate and rhythm


Chest: Able to complete full sentences, no retractions, no tachypnea


Abdomen: abdomen soft, non-tender, non-distended, no organomegaly


Musculoskeletal: Pulses present and equal in all extremities, no peripheral 

edema


Motor:  no focal deficits noted


Neurological: CN II-XII grossly intact, no focal motor or sensory deficits noted


Skin: Intact with no visualized rashes


Psych: Normal affect and mood





ED course: 60-year-old male presents with acute back pain.  She has history of 

fusion surgery to her lumbar spine tender 12 years ago.


Laboratory evaluation obtained.  CBC, remote panels within acceptable limits.  

Urinalysis is negative.  CT lumbar spine was obtained showing subacute versus a

cute sacral alar fractures.  At bedside states she did not fall recently.  It is

unclear how patient got these injuries.  Disposition options were discussed.  

Patient states that her pain is so severe that she cannot perform activities of 

daily living.  Patient be admitted observation.  Case was discussed with 

orthopedic surgery on-call Dr. Garvey who requests the patient be admitted to 

medicine.  Case discussed with Jessica Sharma who is willing to accept patient 

care on behalf of [facility] hospitalist group.











- Related Data


                                Home Medications











 Medication  Instructions  Recorded  Confirmed


 


Ibuprofen [Motrin] 800 mg PO DAILY PRN 06/02/17 05/05/21


 


PARoxetine [Paxil] 20 mg PO HS 06/02/17 05/05/21


 


lisinopriL [Zestril] 20 mg PO DAILY 06/02/17 05/05/21








                                  Previous Rx's











 Medication  Instructions  Recorded


 


Cyclobenzaprine [Flexeril] 5 mg PO TID PRN #15 tablet 04/30/21











                                    Allergies











Allergy/AdvReac Type Severity Reaction Status Date / Time


 


aspirin AdvReac  Bruises Verified 05/05/21 16:37





   easier  





   when  





   taking,  





   Makes ears  





   ring  














Review of Systems


ROS Statement: 


Those systems with pertinent positive or pertinent negative responses have been 

documented in the HPI.





ROS Other: All systems not noted in ROS Statement are negative.





Past Medical History


Past Medical History: Hypertension, Syncope


Additional Past Medical History / Comment(s): chronic back pain


History of Any Multi-Drug Resistant Organisms: None Reported


Past Surgical History: Back Surgery, Hernia Repair, Hysterectomy


Additional Past Surgical History / Comment(s): BMT.  COLONOSCOPY


Past Anesthesia/Blood Transfusion Reactions: No Reported Reaction


Past Psychological History: Anxiety


Smoking Status: Current every day smoker


Past Alcohol Use History: Daily


Past Drug Use History: None Reported





- Past Family History


  ** Mother


Family Medical History: No Reported History





General Exam


Limitations: physical limitation





Course


                                   Vital Signs











  05/05/21





  15:38


 


Temperature 97.2 F L


 


Pulse Rate 110 H


 


Respiratory 18





Rate 


 


Blood Pressure 120/86


 


O2 Sat by Pulse 99





Oximetry 














Medical Decision Making





- Lab Data


Result diagrams: 


                                 05/05/21 16:07





                                 05/05/21 16:07


                                   Lab Results











  05/05/21 05/05/21 05/05/21 Range/Units





  16:07 16:07 16:07 


 


WBC  8.5    (3.8-10.6)  k/uL


 


RBC  3.70 L    (3.80-5.40)  m/uL


 


Hgb  12.9    (11.4-16.0)  gm/dL


 


Hct  38.4    (34.0-46.0)  %


 


MCV  103.9 H    (80.0-100.0)  fL


 


MCH  34.8    (25.0-35.0)  pg


 


MCHC  33.5    (31.0-37.0)  g/dL


 


RDW  12.2    (11.5-15.5)  %


 


Plt Count  413    (150-450)  k/uL


 


MPV  7.2    


 


Neutrophils %  72    %


 


Lymphocytes %  19    %


 


Monocytes %  6    %


 


Eosinophils %  1    %


 


Basophils %  1    %


 


Neutrophils #  6.1    (1.3-7.7)  k/uL


 


Lymphocytes #  1.6    (1.0-4.8)  k/uL


 


Monocytes #  0.5    (0-1.0)  k/uL


 


Eosinophils #  0.0    (0-0.7)  k/uL


 


Basophils #  0.1    (0-0.2)  k/uL


 


Macrocytosis  Slight    


 


APTT    22.7  (22.0-30.0)  sec


 


Sodium   131 L   (137-145)  mmol/L


 


Potassium   4.3   (3.5-5.1)  mmol/L


 


Chloride   97 L   ()  mmol/L


 


Carbon Dioxide   24   (22-30)  mmol/L


 


Anion Gap   10   mmol/L


 


BUN   12   (7-17)  mg/dL


 


Creatinine   0.72   (0.52-1.04)  mg/dL


 


Est GFR (CKD-EPI)AfAm   >90   (>60 ml/min/1.73 sqM)  


 


Est GFR (CKD-EPI)NonAf   >90   (>60 ml/min/1.73 sqM)  


 


Glucose   90   (74-99)  mg/dL


 


Calcium   9.7   (8.4-10.2)  mg/dL


 


Urine Color     


 


Urine Appearance     (Clear)  


 


Urine pH     (5.0-8.0)  


 


Ur Specific Gravity     (1.001-1.035)  


 


Urine Protein     (Negative)  


 


Urine Glucose (UA)     (Negative)  


 


Urine Ketones     (Negative)  


 


Urine Blood     (Negative)  


 


Urine Nitrite     (Negative)  


 


Urine Bilirubin     (Negative)  


 


Urine Urobilinogen     (<2.0)  mg/dL


 


Ur Leukocyte Esterase     (Negative)  














  05/05/21 Range/Units





  16:07 


 


WBC   (3.8-10.6)  k/uL


 


RBC   (3.80-5.40)  m/uL


 


Hgb   (11.4-16.0)  gm/dL


 


Hct   (34.0-46.0)  %


 


MCV   (80.0-100.0)  fL


 


MCH   (25.0-35.0)  pg


 


MCHC   (31.0-37.0)  g/dL


 


RDW   (11.5-15.5)  %


 


Plt Count   (150-450)  k/uL


 


MPV   


 


Neutrophils %   %


 


Lymphocytes %   %


 


Monocytes %   %


 


Eosinophils %   %


 


Basophils %   %


 


Neutrophils #   (1.3-7.7)  k/uL


 


Lymphocytes #   (1.0-4.8)  k/uL


 


Monocytes #   (0-1.0)  k/uL


 


Eosinophils #   (0-0.7)  k/uL


 


Basophils #   (0-0.2)  k/uL


 


Macrocytosis   


 


APTT   (22.0-30.0)  sec


 


Sodium   (137-145)  mmol/L


 


Potassium   (3.5-5.1)  mmol/L


 


Chloride   ()  mmol/L


 


Carbon Dioxide   (22-30)  mmol/L


 


Anion Gap   mmol/L


 


BUN   (7-17)  mg/dL


 


Creatinine   (0.52-1.04)  mg/dL


 


Est GFR (CKD-EPI)AfAm   (>60 ml/min/1.73 sqM)  


 


Est GFR (CKD-EPI)NonAf   (>60 ml/min/1.73 sqM)  


 


Glucose   (74-99)  mg/dL


 


Calcium   (8.4-10.2)  mg/dL


 


Urine Color  Yellow  


 


Urine Appearance  Clear  (Clear)  


 


Urine pH  6.0  (5.0-8.0)  


 


Ur Specific Gravity  1.014  (1.001-1.035)  


 


Urine Protein  Negative  (Negative)  


 


Urine Glucose (UA)  Negative  (Negative)  


 


Urine Ketones  Negative  (Negative)  


 


Urine Blood  Negative  (Negative)  


 


Urine Nitrite  Negative  (Negative)  


 


Urine Bilirubin  Negative  (Negative)  


 


Urine Urobilinogen  <2.0  (<2.0)  mg/dL


 


Ur Leukocyte Esterase  Negative  (Negative)  














Disposition


Clinical Impression: 


 Sacral fracture





Disposition: ADMITTED AS IP TO THIS HOSP


Condition: Fair


Referrals: 


Leonila Pinon MD [Primary Care Provider] - 1-2 days


Decision Time: 17:24

## 2021-05-06 VITALS — RESPIRATION RATE: 18 BRPM

## 2021-05-06 RX ADMIN — HYDROMORPHONE HYDROCHLORIDE PRN MG: 1 INJECTION, SOLUTION INTRAMUSCULAR; INTRAVENOUS; SUBCUTANEOUS at 07:31

## 2021-05-06 RX ADMIN — FAMOTIDINE SCH MG: 20 TABLET, FILM COATED ORAL at 19:56

## 2021-05-06 RX ADMIN — FAMOTIDINE SCH MG: 20 TABLET, FILM COATED ORAL at 11:33

## 2021-05-06 RX ADMIN — SENNOSIDES SCH MG: 8.6 TABLET, FILM COATED ORAL at 19:56

## 2021-05-06 RX ADMIN — CYCLOBENZAPRINE HYDROCHLORIDE PRN MG: 5 TABLET, FILM COATED ORAL at 18:05

## 2021-05-06 RX ADMIN — CEFAZOLIN SCH MLS/HR: 330 INJECTION, POWDER, FOR SOLUTION INTRAMUSCULAR; INTRAVENOUS at 19:57

## 2021-05-06 RX ADMIN — HYDROMORPHONE HYDROCHLORIDE PRN MG: 1 INJECTION, SOLUTION INTRAMUSCULAR; INTRAVENOUS; SUBCUTANEOUS at 04:05

## 2021-05-06 RX ADMIN — CEFAZOLIN SCH MLS/HR: 330 INJECTION, POWDER, FOR SOLUTION INTRAMUSCULAR; INTRAVENOUS at 11:33

## 2021-05-06 RX ADMIN — CYCLOBENZAPRINE HYDROCHLORIDE PRN MG: 5 TABLET, FILM COATED ORAL at 00:10

## 2021-05-06 RX ADMIN — HYDROMORPHONE HYDROCHLORIDE PRN MG: 1 INJECTION, SOLUTION INTRAMUSCULAR; INTRAVENOUS; SUBCUTANEOUS at 11:33

## 2021-05-06 RX ADMIN — HYDROMORPHONE HYDROCHLORIDE PRN MG: 1 INJECTION, SOLUTION INTRAMUSCULAR; INTRAVENOUS; SUBCUTANEOUS at 19:56

## 2021-05-06 RX ADMIN — SENNOSIDES SCH MG: 8.6 TABLET, FILM COATED ORAL at 11:33

## 2021-05-06 RX ADMIN — HYDROMORPHONE HYDROCHLORIDE PRN MG: 1 INJECTION, SOLUTION INTRAMUSCULAR; INTRAVENOUS; SUBCUTANEOUS at 15:43

## 2021-05-06 NOTE — P.CNOR
History of Present Illness





- HPI


Consult date: 05/06/21


Consult reason: fracture


History of present illness: 





Patient is a pleasant 60-year-old female has been having severe low back and 

buttock pain over the past week.  She denies any specific injury or trauma.  She

admits to some numbness tingling down her right lower extremity at the side of 

her thigh.


She apparently had lumbar spinal fusion surgery with Dr. Prasad approximately 

11 years ago at [facility].  She says she did okay for a few years but over the 

past 8 years she has been having increasing pain at her low back.  She was still

able get around and do activities but had to be on disability due to her issues.

 She says that a year ago she had sustained some falls after having some sinus 

issues was not having any low back changes from that.  Over this past week she 

has been having worsening pain at her low back without any specific incident or 

trauma.  She says the pain is centered around her buttocks and extends down her 

right lower extremity with some numbness and tingling.





She denies any chest pain shortness of breath, she denies any fevers chills, she

denies a acute change in her bowel bladder function however she has such 

difficulty trying to get up out of bed that when she has to urinate she has not 

been able to have time to get to the bathroom as had some incontinence of urine.

 She denies incontinence of bowel function.  She denies any urinary retention.





Review of Systems





As stated per HPI.  Denies any chest pain shortness breath.  Denies any abdomin

al pain.





Past Medical History


Past Medical History: COPD, Hypertension, Syncope


Additional Past Medical History / Comment(s): History lumbar fusion 11 years ago

at [facility] with Dr. Prasad chronic back pain


History of Any Multi-Drug Resistant Organisms: None Reported


Past Surgical History: Back Surgery, Hernia Repair, Hysterectomy


Additional Past Surgical History / Comment(s): BMT.  COLONOSCOPY


Past Anesthesia/Blood Transfusion Reactions: No Reported Reaction


Past Psychological History: Anxiety


Smoking Status: Current every day smoker


Past Alcohol Use History: Daily


Additional Past Alcohol Use History / Comment(s): SMOKES 1/2 PPD SINCE AGE 15


Past Drug Use History: None Reported





- Past Family History


  ** Mother


Family Medical History: No Reported History





Medications and Allergies


                                Home Medications











 Medication  Instructions  Recorded  Confirmed  Type


 


Ibuprofen [Motrin] 800 mg PO DAILY PRN 06/02/17 05/05/21 History


 


PARoxetine [Paxil] 20 mg PO HS 06/02/17 05/05/21 History


 


lisinopriL [Zestril] 20 mg PO DAILY 06/02/17 05/05/21 History


 


Cyclobenzaprine [Flexeril] 5 mg PO TID PRN #15 tablet 04/30/21 05/05/21 Rx








                                    Allergies











Allergy/AdvReac Type Severity Reaction Status Date / Time


 


aspirin AdvReac  Bruises Verified 05/05/21 16:37





   easier  





   when  





   taking,  





   Makes ears  





   ring  














Physical Examination


Osteopathic Statement: *.  No significant issues noted on an osteopathic 

structural exam other than those noted in the History and Physical/Consult.





- L Spine: dermatomal strength & reflexes


  ** bilateral


Strength: hip flexion: 5/5 (She has pain over her sacrum with palpation and has 

pain with pelvic compression at her sacrum on exam.  She has good strength in 

her lower extremity is with dorsal flexion plantar flexion and EHL hip flexion 

and extension though this does aggravate her pain at her lower back.  She has 

negative clon)


Strength: hip extension: 5/5 (No clonus no hyperreflexia.  She has good 5 over 5

 strength with dorsi flexion plantarflexion and EHL)





Results





- Labs


Labs: 


                  Abnormal Lab Results - Last 24 Hours (Table)











  05/05/21 05/05/21 Range/Units





  16:07 16:07 


 


RBC  3.70 L   (3.80-5.40)  m/uL


 


MCV  103.9 H   (80.0-100.0)  fL


 


Sodium   131 L  (137-145)  mmol/L


 


Chloride   97 L  ()  mmol/L








                                      H & H











  05/05/21 Range/Units





  16:07 


 


Hgb  12.9  (11.4-16.0)  gm/dL


 


Hct  38.4  (34.0-46.0)  %











Result Diagrams: 


                                 05/05/21 16:07





                                 05/05/21 16:07





- Diagnostic results


CT Scan - lumbar: report reviewed, image reviewed (CT lumbar spine shows prior 

fusion L4 5 which appears to be stable with hardware intact.  There is signifi

cant disc degeneration L3 4 with bony sclerosis disc height loss.  There is 

slight listhesis of of L5-S1.  There is evidence of fracture line at the 

bilateral sacral ala of uncertain age)





Assessment and Plan


Assessment: 





Sacral insufficiency fractures bilaterally of uncertain age


Acute on chronic low back pain


Right lower extremity radiculopathy


History lumbar fusion L4 5 done in [facility] 11 years ago


Degenerative disc disease L3 4 and L5-S1


Plan: 





Sacral insufficiency fractures bilaterally of uncertain age


Acute on chronic low back pain


Right lower extremity radiculopathy


History lumbar fusion L4 5 done in [facility] 11 years ago


Degenerative disc disease L3 4 and L5-S1





The computed tomography scan shows some sacral insufficiency type fractures.  

The patient did not have any new trauma or incident and it is uncertain as far 

as the chronicity of these fractures.  She has history of lumbar spinal fusion 

with adjacent level degeneration above and below her fusion which was performed 

at [facility] 11 years ago.  She is having some articular symptoms at her right 

lower extremity and with the host of her issues I think that further imaging is 

necessary with MRI lumbar spine to evaluate her low back and sacrum.





She is neurologically intact and I do not plan acute surgical intervention at 

this point however we will have further recommendations after the MRI imaging.  

I think he would be okay for her to try to work with physical therapy for some 

transfer training for wheelchair.





She should continue her pain control and medical management.  We will follow her

 along with you.

## 2021-05-06 NOTE — MR
EXAMINATION TYPE: MR lumbar spine wo/w con

 

DATE OF EXAM: 5/6/2021

 

COMPARISON: 5/5/2021 CT lumbar spine

 

HISTORY: Sacral insufficiency fractures, history of lumbar fusion, low back pain.

 

CONTRAST:  8 mL intravenous Gadavist.

 

TECHNIQUE: 

Multiplanar, multisequence images of the lumbar spine were acquired.

 

FINDINGS:  Lowest disc level be labeled L5-S1.

L5-S1: No significant disc bulge or disc herniation.  No spinal canal stenosis.  No foraminal stenosi
s. 

 

L4-L5: Posterior disc space narrowing is present. Facet hypertrophy has posterior lateral thecal sac 
compression. No AP spinal canal stenosis is present. Moderate bilateral foraminal stenosis is present
. Subtle grade 1 spondylolisthesis of L4 anteriorly on L5 may be present. Less prominent than on the 
CT exam.

 

L3-L4: Disc space narrowing is present. No focal disc herniation or significant disc bulge. No AP spi
nal canal stenosis present. There is fusion of the L3-L4 level with pedicle screws present causing muñoz
sceptibility artifact and limitation.

 

L2-L3: Mild disc bulge is present on the sagittal plane with mild anterior thecal sac compression. No
 AP spinal canal stenosis is present.  No spinal canal stenosis.  No foraminal stenosis.  Endplate ch
anges are present.

 

L1-L2: No significant disc bulge or disc herniation.  No spinal canal stenosis.  No foraminal stenosi
s.

 

T12-L1: No significant disc bulge or disc herniation.  No spinal canal stenosis.  No foraminal stenos
is. 

 

No abnormal enhancement.

 

There is increased signal within the mid and distal sacrum which could correlate with acute insuffici
ency fractures in the sacrum.

 

IMPRESSION:

1. Sacral insufficiency fractures partially visualized during this exam.

2. Mild disc bulge at L2-L3 with mild anterior thecal sac flattening. No AP spinal canal stenosis pre
sent.

3. Foraminal narrowing L4-5.

4. A subtle grade 1 spondylolisthesis is less well-visualized than on the CT examination at the L4-5 
level.

## 2021-05-06 NOTE — P.HPIM
History of Present Illness


1-year-old female came in with severe low back pain and buttock pain patient has

chronic low back pain has been going on for a long time.  Then is much worse for

last week and has been drinking alcohol to control pain.  Patient denied any 

fall or any other,.  Patient had lumbar spinal fusion surgery 11 years ago at 

[facility] after that she did well and started having pain again after 

few years.  Patient denied any fever chills patient had an dysuria had pain 

radiates to the right thigh area and goes up to the right knee.  Patient denied 

any chest pain shortness of breath fever chills.  Patient had a CT of the lumbar

spine which showed significant disc degeneration at L3-L4 level with bony 

sclerosis and height loss there is a fracture in the sacral area of uncertain 

duration.  Patient was evaluated by orthopedic surgeon who specializes in back 

pain.  Physical therapy and occupational therapy was consulted continue with the

present pain medications and as antispasmodics.  Patient was having Back spasms 

in the back





Review of Systems








REVIEW OF SYSTEMS: 


CONSTITUTIONAL: No fever, no malaise, no fatigue. 


HEENT: No recent visual problems or hearing problems. Denied any sore throat. 


CARDIOVASCULAR: No chest pain, orthopnea, PND, no palpitations, no syncope. 


PULMONARY: No shortness of breath, no cough, no hemoptysis. 


GASTROINTESTINAL: No diarrhea, no nausea, no vomiting, no abdominal pain. 


NEUROLOGICAL: No headaches, no weakness, no numbness. 


HEMATOLOGICAL: Denies any bleeding or petechiae. 


GENITOURINARY: Denies any burning micturition, frequency, or urgency. 


MUSCULOSKELETAL/RHEUMATOLOGICAL: Pain as mentioned above


ENDOCRINE: Denies any polyuria or polydipsia. 





The rest of the 14-point review of systems is negative.








Past Medical History


Past Medical History: COPD, Hypertension, Syncope


Additional Past Medical History / Comment(s): History lumbar fusion 11 years ago

at [facility] with Dr. Prasad chronic back pain


History of Any Multi-Drug Resistant Organisms: None Reported


Past Surgical History: Back Surgery, Hernia Repair, Hysterectomy


Additional Past Surgical History / Comment(s): BMT.  COLONOSCOPY


Past Anesthesia/Blood Transfusion Reactions: No Reported Reaction


Past Psychological History: Anxiety


Smoking Status: Current every day smoker


Past Alcohol Use History: Daily


Additional Past Alcohol Use History / Comment(s): SMOKES 1/2 PPD SINCE AGE 15


Past Drug Use History: None Reported





- Past Family History


  ** Mother


Family Medical History: No Reported History





Medications and Allergies


                                Home Medications











 Medication  Instructions  Recorded  Confirmed  Type


 


Ibuprofen [Motrin] 800 mg PO DAILY PRN 06/02/17 05/05/21 History


 


PARoxetine [Paxil] 20 mg PO HS 06/02/17 05/05/21 History


 


lisinopriL [Zestril] 20 mg PO DAILY 06/02/17 05/05/21 History


 


Cyclobenzaprine [Flexeril] 5 mg PO TID PRN #15 tablet 04/30/21 05/05/21 Rx








                                    Allergies











Allergy/AdvReac Type Severity Reaction Status Date / Time


 


aspirin AdvReac  Bruises Verified 05/05/21 16:37





   easier  





   when  





   taking,  





   Makes ears  





   ring  














Physical Exam


Vitals: 


                                   Vital Signs











  Temp Pulse Pulse Resp BP BP Pulse Ox


 


 05/06/21 08:00     16   


 


 05/06/21 07:00  97.7 F   104 H  16   98/59  98


 


 05/06/21 02:00  97.7 F   86  16   96/65  93 L


 


 05/05/21 20:17     18   


 


 05/05/21 20:00  97.9 F   85  16   103/63  94 L


 


 05/05/21 17:54   86   18  102/66   96


 


 05/05/21 15:38  97.2 F L  110 H   18  120/86   99








                                Intake and Output











 05/05/21 05/06/21 05/06/21





 22:59 06:59 14:59


 


Intake Total  160 


 


Balance  160 


 


Intake:   


 


  IV  160 


 


    Sodium Chloride 0.9% 1,  160 





    000 ml @ 20 mls/hr IV .   





    Q24H [facility] Rx#:[id number]   


 


Other:   


 


  Voiding Method Diaper Diaper Diaper


 


  # Voids 1  


 


  Weight 81.647 kg  

















PHYSICAL EXAMINATION: 





GENERAL: The patient is alert and oriented x3, not in any acute distress. Well 

developed, well nourished. 


HEENT: Pupils are round and equally reacting to light. EOMI. No scleral icterus.

 No conjunctival pallor. Normocephalic, atraumatic. No pharyngeal erythema. No 

thyromegaly. 


CARDIOVASCULAR: S1 and S2 present. No murmurs, rubs, or gallops. 


PULMONARY: Chest is clear to auscultation, no wheezing or crackles. 


ABDOMEN: Soft, nontender, nondistended, normoactive bowel sounds. No palpable 

organomegaly. 


MUSCULOSKELETAL: A back pain because of which are limited


EXTREMITIES: No cyanosis, clubbing, or pedal edema. 


NEUROLOGICAL: Didn't appear to have any focal weakness although limited because 

of severe pain unable to assess both lower extremities.


SKIN: No rashes. 














Results


CBC & Chem 7: 


                                 05/05/21 16:07





                                 05/05/21 16:07


Labs: 


                  Abnormal Lab Results - Last 24 Hours (Table)











  05/05/21 05/05/21 Range/Units





  16:07 16:07 


 


RBC  3.70 L   (3.80-5.40)  m/uL


 


MCV  103.9 H   (80.0-100.0)  fL


 


Sodium   131 L  (137-145)  mmol/L


 


Chloride   97 L  ()  mmol/L














Thrombosis Risk Factor Assmnt





- Choose All That Apply


Any of the Below Risk Factors Present?: Yes


Each Factor Represents 1 point: Abnormal pulmonary function (COPD), Obesity (BMI

 >25)


Other Risk Factors: Yes


Each Risk Factor Represents 2 Points: Age 61-74 years


Other congenital or acquired thrombophilia - If yes, enter type in comment: No


Thrombosis Risk Factor Assessment Total Risk Factor Score: 4


Thrombosis Risk Factor Assessment Level: Moderate Risk





Assessment and Plan


Plan: 


-Back pain secondary to chronic fractures in the back.  Patient has acute on 

chronic low back pain.  Severe radiculopathy.  Patient will be continued on 

anti-inflammatory medications along with the opiates.  Orthopedic surgery 

evaluated the patient with the hardware unsure if patient can get an MRI.


-Lumbar degenerative disc disease


-Continue nicotine use: Counseling was provided


-Hyponatremia: Patient will be started on IV fluids possibly hypovolemic 

hyponatremia considering her low blood pressure although can be SIADH from pain.


-Hypertension: Patient is currently hypotensive hold off on ACE inhibitor


-COPD without any acute exacerbation


-DVT prophylaxis with Lovenox

## 2021-05-07 VITALS — TEMPERATURE: 98.1 F | HEART RATE: 98 BPM | DIASTOLIC BLOOD PRESSURE: 74 MMHG | SYSTOLIC BLOOD PRESSURE: 118 MMHG

## 2021-05-07 RX ADMIN — HYDROMORPHONE HYDROCHLORIDE PRN MG: 1 INJECTION, SOLUTION INTRAMUSCULAR; INTRAVENOUS; SUBCUTANEOUS at 00:29

## 2021-05-07 RX ADMIN — HYDROMORPHONE HYDROCHLORIDE PRN MG: 1 INJECTION, SOLUTION INTRAMUSCULAR; INTRAVENOUS; SUBCUTANEOUS at 15:20

## 2021-05-07 RX ADMIN — CEFAZOLIN SCH: 330 INJECTION, POWDER, FOR SOLUTION INTRAMUSCULAR; INTRAVENOUS at 14:14

## 2021-05-07 RX ADMIN — HYDROMORPHONE HYDROCHLORIDE PRN MG: 1 INJECTION, SOLUTION INTRAMUSCULAR; INTRAVENOUS; SUBCUTANEOUS at 04:02

## 2021-05-07 RX ADMIN — FAMOTIDINE SCH MG: 20 TABLET, FILM COATED ORAL at 07:39

## 2021-05-07 RX ADMIN — SENNOSIDES SCH MG: 8.6 TABLET, FILM COATED ORAL at 07:39

## 2021-05-07 RX ADMIN — HYDROMORPHONE HYDROCHLORIDE PRN MG: 1 INJECTION, SOLUTION INTRAMUSCULAR; INTRAVENOUS; SUBCUTANEOUS at 07:42

## 2021-05-07 RX ADMIN — CEFAZOLIN SCH MLS/HR: 330 INJECTION, POWDER, FOR SOLUTION INTRAMUSCULAR; INTRAVENOUS at 06:14

## 2021-05-07 RX ADMIN — CYCLOBENZAPRINE HYDROCHLORIDE PRN MG: 5 TABLET, FILM COATED ORAL at 03:09

## 2021-05-07 NOTE — P.PN
Progress Note - Text


Progress Note Date: 05/07/21





Orthopedic spine:





History of present illness:





Patient is a very pleasant 60-year-old female who is seen and examined at 

bedside for follow up evaluation for her lumbar spine.  She is recently 

diagnosed with bilateral sacral alar fractures of indeterminate age on CT 

imaging.  Lumbar MRI performed yesterday did show some increased signal at the 

mid to distal sacrum to correlate with acute insufficiency fracture.  Patient 

states her pain has not significantly changed as compared to yesterday.  Her 

pain is controlled arrest.  She does have increased pain in her low back and 

buttocks with trying to lift her legs off the bed independently.  She has some 

pain with rolling over in bed.  She does continue to have some radicular pain 

with pain radiating down the right lateral thigh.  She has a history of previous

fusion performed by Dr. Prasad 11 years ago.  She states at the bedside she 

would like to be discharged home.  She does not wish for discharge to a 

rehabilitation facility.  She does not wish for discharge home with home care.  

She does feel she may be discharged home today if cleared by medicine.  She does

have a walker at home to help aid in ambulation.  Patient's other medical 

history includes COPD and hypertension.








Physical exam:





Patient is awake, alert, and oriented 3


Vital signs stable


Adequate chest excursion with deep inspiration and expiration; O2 nasal cannula 

intact


Examination of lumbar spine reveals skin is intact with no abrasions, 

lacerations, or bruises; no erythema, purulence or signs of infection


Evidence of a large well-healed incision along the midline of the lumbar spine


Generalized pain with palpation over the lumbosacral spine and sacrum


Dorsiflexion, plantarflexion, and extensor hallucis longus positive sustained 

bilaterally


Patient is able to perform active range of motion bilateral lower extremities 

but has increased lumbosacral pain with hip flexion bilaterally


Increased low back pain with Lasegue's test bilaterally


No signs or symptoms of DVT; no calf pain


No pain with internal and external rotation of the hips bilaterally


Neurovascularly intact








Pertinent studies:





MRI of the lumbar spine taken on 05/06/2021: Based on documentation from 

previous lumbar CT imaging, we staying consistent and calling her fusion level 

at L4-5; evidence of sacral insufficiency fractures partially visualized which 

do show increased signal within the mid to distal sacrum L3-4 degenerative disc 

disease and disc bulging without stenosis; L4-5 evidence of posterior 

decompression and fusion without evidence of spinal stenosis; L5-S1 degenerative

disc disease, facet hypertrophy, subtle spondylolisthesis, and disc bulging with

moderate bilateral foraminal stenosis and mild central stenosis





CT of the lumbar spine taken on 05/05/2021: L3-4 significant degenerative disc 

disease with sclerosis of the endplates and anterior osteophytic spurring; L4-5 

evidence of a disc fusion with hardware intact; L5-S1 spondylolisthesis; 

bilateral sacral alar fractures of indeterminate age








Assessment:





Acute bilateral sacral insufficiency fracture


Lumbosacral pain


Right lower extremity radiculopathy


History of L4-5 posterior fusion


L5-S1 spondylolisthesis


L5-S1 bilateral neural foraminal stenosis and mild central stenosis


L3-4 significant degenerative disc disease with anterior osteophytic spurring


Lumbar muscle spasm


COPD


Hypertension








Plan:





1.  After reviewing of imaging, physical examination the patient, and further 

discussion with the patient, we will currently plan to continue conservative 

treatment.  Patient does have evidence of acute bilateral sacral insufficiency 

fractures visualized on lumbar MRI imaging and lumbar CT imaging.  Patient does 

feel her pain is adequately controlled while at rest.  She does have increased 

pain in the buttocks and lumbosacral spine with increased mobility.  We did 

discuss her fusion remains intact at L4-5.  She does have adjacent level 

degenerative disc disease above and below her fusion.  At this time, given her 

acute fractures, we would not plan for acute surgical intervention or 

consultation with pain management.  Patient does not wish for consultation with 

pain management.  Patient states at the bedside she does feel she would be ready

for discharge home.  She will avoid excessive activities.  Patient should avoid 

excessive bending, twisting, lifting; no lifting greater than 10 pounds.  She 

does have a walker at home which she states she will use to aid in ambulation as

needed.  She declines evaluation with social work/case management at this time. 

Patient states she does not wish to be discharged home with home care.  She does

not wish to be discharged to a rehabilitation facility.  She states her family 

will be able to help her at home.  We discussed we will plan to discharge 

patient home with medication for pain control.





MAPS has been reviewed.  An "Opiod Start Talking" Form has been signed and chester

sis in the patient's chart.  A prescription has been written for Norco 5 mg/325 

mg 1 tablet every 6 hours as needed for pain.  Dispense #28.





Patient is also given a prescription for cyclobenzaprine 10 mg 1 tab 3 times a 

day as needed for muscle spasm, dispensed #60.





Per the patient's request, medications were sent to her regular pharmacy in 

[place], [place].





From an orthopedic standpoint, patient is clear for discharge once cleared by 

medicine.  Patient may follow-up with Duc Bonilla PA-C or Dr. John Garvey at 

[facility] in 2 weeks following discharge.





2.  Patient will continue to be seen and examined by medicine

## 2021-05-07 NOTE — P.DS
Providers


Date of admission: 


05/07/21 08:57





Expected date of discharge: 05/07/21


Attending physician: 


Veronica De La Vega





Consults: 





                                        





05/05/21 17:18


Consult Physician Routine 


   Consulting Provider: LISET Garvey


   Consult Reason/Comments: sacral fractures


   Do you want consulting provider notified?: Already Contacted











Primary care physician: 


Leonila Pinon





Hospital Course: 








Final diagnosis





-Back pain secondary to chronic fractures in the back.  Patient has acute on 

chronic low back pain.  Severe radiculopathy.  Patient will be continued on 

anti-inflammatory medications along with the opiates.  Orthopedic surgery 

evaluated the patient with the hardware unsure if patient can get an MRI.


-Lumbar degenerative disc disease


-Continue nicotine use: Counseling was provided


-Hyponatremia: Patient will be started on IV fluids possibly hypovolemic 

hyponatremia considering her low blood pressure although can be SIADH from pain.


-Hypertension: Patient is currently hypotensive hold off on ACE inhibitor


-COPD without any acute exacerbation


-DVT prophylaxis








Discharge disposition


Patient is being discharged in a stable condition with guarded prognosis to 

home.  Patient will follow-up with Dr. Pinon in the outpatient setting upon 

discharge.  Patient is to follow-up with orthopedics in the outpatient setting 

as well.  Total time taken is greater than 35 minutes.





Hospital course





60-year-old female came in with severe low back pain and buttock pain patient 

has chronic low back pain has been going on for a long time.  Then is much worse

for last week and has been drinking alcohol to control pain.  Patient denied any

fall or any other,.  Patient had lumbar spinal fusion surgery 11 years ago at 

[facility] after that she did well and started having pain again after 

few years.  Patient denied any fever chills patient had an dysuria had pain 

radiates to the right thigh area and goes up to the right knee.  Patient denied 

any chest pain shortness of breath fever chills.  Patient had a CT of the lumbar

spine which showed significant disc degeneration at L3-L4 level with bony 

sclerosis and height loss there is a fracture in the sacral area of uncertain 

duration.  Patient was evaluated by orthopedic surgeon who specializes in back 

pain.  Physical therapy and occupational therapy was consulted continue with the

present pain medications and as antispasmodics.  Patient was having Back spasms 

in the back





05/07/2021





Patient is seen and evaluated and follow-up with no acute overnight issues.  

Patient continues to have back discomfort and was seen and evaluated by 

orthopedic surgery and underwent MRI and patient will follow-up outpatient with 

Dirk Bonilla as discussed with the patient.  Patient does have a walker and 

assistance at home and initially refusing Homecare or rehab at this time.  

Patient continues with weakness and recommended by PT/OT subacute rehab and 

patient is now agreeable to [facility]. Patient was provided muscle relaxers along 

with pain medication by orthopedics and again will follow-up with them 

outpatient.  Instructed to follow-up with primary care provider upon discharge. 

Currently no reports of chest pain, shortness of breath, or palpitations.  Esteban ferrara is afebrile.  No reports of nausea or vomiting and patient is tolerating 

diet.  Patient will be going to [facility] today.





On exam vital signs are stable. Cardio S1, S2 are muffled.  Respiratory system 

shows diminished breath sounds at the bases with no wheezing or rhonchi noted.  

Abdomen is soft and nontender.  Nervous system shows no focal deficits.





Please refer to medication reconciliation sheet for a list of medications.


Patient Condition at Discharge: Fair





Plan - Discharge Summary


Discharge Rx Participant: No


New Discharge Prescriptions: 


New


   Cyclobenzaprine [Flexeril] 10 mg PO TID PRN #60 tab


     PRN Reason: Muscle Spasm


   HYDROcodone/APAP 5-325MG [Norco 5] 1 each PO Q6HR PRN #28 tab


     PRN Reason: Pain


   Famotidine [Pepcid] 20 mg PO BID 30 Days #60 tab


   Sennosides [Senokot] 8.6 mg PO BID PRN #20 tab


     PRN Reason: Constipation


   Acetaminophen Tab [Tylenol] 650 mg PO Q6HR PRN  tab


     PRN Reason: Mild Pain Or Fever > 100.5





Continue


   PARoxetine [Paxil] 20 mg PO HS


   Ibuprofen [Motrin] 800 mg PO DAILY PRN


     PRN Reason: Pain


   Cyclobenzaprine [Flexeril] 5 mg PO TID PRN #15 tablet


     PRN Reason: Muscle Spasm





Discontinued


   lisinopriL [Zestril] 20 mg PO DAILY


Discharge Medication List





Ibuprofen [Motrin] 800 mg PO DAILY PRN 06/02/17 [History]


PARoxetine [Paxil] 20 mg PO HS 06/02/17 [History]


Cyclobenzaprine [Flexeril] 5 mg PO TID PRN #15 tablet 04/30/21 [Rx]


Acetaminophen Tab [Tylenol] 650 mg PO Q6HR PRN  tab 05/07/21 [Rx]


Cyclobenzaprine [Flexeril] 10 mg PO TID PRN #60 tab 05/07/21 [Rx]


Famotidine [Pepcid] 20 mg PO BID 30 Days #60 tab 05/07/21 [Rx]


HYDROcodone/APAP 5-325MG [Norco 5] 1 each PO Q6HR PRN #28 tab 05/07/21 [Rx]


Sennosides [Senokot] 8.6 mg PO BID PRN #20 tab 05/07/21 [Rx]








Follow up Appointment(s)/Referral(s): 


Duc Bonilla PAC [PHYSICIAN ASSISTANT] - 2 Weeks


(Patient may follow-up with Duc Bonilla PA-C or Dr. John Garvey at [facility] in 2 weeks following discharge.


)


Leonila Pinon MD [Primary Care Provider] - 1-2 days


Patient Instructions/Handouts:  Sacral Fracture (DC)


Activity/Diet/Wound Care/Special Instructions: 


1.  Patient may ambulate to tolerance in the bilateral lower extremities and is 

weightbearing as tolerated


2.  Avoid excessive activities with the bilateral lower extremities


3.  Avoid excessive bending, twisting, lifting; no lifting greater than 10 

pounds


4.  Patient encouraged to use a walker to aid in ambulation as needed


5.  Take medications as prescribed





Follow-up with ortho in the outpatient setting


Follow-up with primary care provider upon discharge


Continue to hold blood pressure medication lisinopril and monitor blood pressure

daily and keep a diary for primary care follow-up


Continue current diet








Discharge Disposition: HOME SELF-CARE